# Patient Record
Sex: FEMALE | Race: WHITE | NOT HISPANIC OR LATINO | Employment: UNEMPLOYED | URBAN - METROPOLITAN AREA
[De-identification: names, ages, dates, MRNs, and addresses within clinical notes are randomized per-mention and may not be internally consistent; named-entity substitution may affect disease eponyms.]

---

## 2024-03-08 ENCOUNTER — PHARMACY VISIT (OUTPATIENT)
Dept: PHARMACY | Facility: MEDICAL CENTER | Age: 39
End: 2024-03-08
Payer: COMMERCIAL

## 2024-03-08 ENCOUNTER — HOSPITAL ENCOUNTER (EMERGENCY)
Facility: MEDICAL CENTER | Age: 39
End: 2024-03-08
Attending: EMERGENCY MEDICINE

## 2024-03-08 VITALS
RESPIRATION RATE: 18 BRPM | BODY MASS INDEX: 22.15 KG/M2 | HEART RATE: 107 BPM | OXYGEN SATURATION: 92 % | SYSTOLIC BLOOD PRESSURE: 168 MMHG | DIASTOLIC BLOOD PRESSURE: 95 MMHG | HEIGHT: 63 IN | TEMPERATURE: 98.1 F | WEIGHT: 125 LBS

## 2024-03-08 DIAGNOSIS — H66.012 ACUTE SUPPURATIVE OTITIS MEDIA OF LEFT EAR WITH SPONTANEOUS RUPTURE OF TYMPANIC MEMBRANE, RECURRENCE NOT SPECIFIED: ICD-10-CM

## 2024-03-08 DIAGNOSIS — I10 PRIMARY HYPERTENSION: ICD-10-CM

## 2024-03-08 LAB
ANION GAP SERPL CALC-SCNC: 13 MMOL/L (ref 7–16)
BASOPHILS # BLD AUTO: 0.5 % (ref 0–1.8)
BASOPHILS # BLD: 0.03 K/UL (ref 0–0.12)
BUN SERPL-MCNC: 14 MG/DL (ref 8–22)
CALCIUM SERPL-MCNC: 8.8 MG/DL (ref 8.5–10.5)
CHLORIDE SERPL-SCNC: 104 MMOL/L (ref 96–112)
CO2 SERPL-SCNC: 23 MMOL/L (ref 20–33)
CREAT SERPL-MCNC: 0.7 MG/DL (ref 0.5–1.4)
EKG IMPRESSION: NORMAL
EOSINOPHIL # BLD AUTO: 0.1 K/UL (ref 0–0.51)
EOSINOPHIL NFR BLD: 1.5 % (ref 0–6.9)
ERYTHROCYTE [DISTWIDTH] IN BLOOD BY AUTOMATED COUNT: 48.1 FL (ref 35.9–50)
GFR SERPLBLD CREATININE-BSD FMLA CKD-EPI: 113 ML/MIN/1.73 M 2
GLUCOSE SERPL-MCNC: 143 MG/DL (ref 65–99)
HCG SERPL QL: NEGATIVE
HCT VFR BLD AUTO: 43.3 % (ref 37–47)
HGB BLD-MCNC: 14.9 G/DL (ref 12–16)
IMM GRANULOCYTES # BLD AUTO: 0.01 K/UL (ref 0–0.11)
IMM GRANULOCYTES NFR BLD AUTO: 0.2 % (ref 0–0.9)
LYMPHOCYTES # BLD AUTO: 1.25 K/UL (ref 1–4.8)
LYMPHOCYTES NFR BLD: 19.1 % (ref 22–41)
MCH RBC QN AUTO: 27.7 PG (ref 27–33)
MCHC RBC AUTO-ENTMCNC: 34.4 G/DL (ref 32.2–35.5)
MCV RBC AUTO: 80.5 FL (ref 81.4–97.8)
MONOCYTES # BLD AUTO: 0.69 K/UL (ref 0–0.85)
MONOCYTES NFR BLD AUTO: 10.5 % (ref 0–13.4)
NEUTROPHILS # BLD AUTO: 4.48 K/UL (ref 1.82–7.42)
NEUTROPHILS NFR BLD: 68.2 % (ref 44–72)
NRBC # BLD AUTO: 0 K/UL
NRBC BLD-RTO: 0 /100 WBC (ref 0–0.2)
PLATELET # BLD AUTO: 301 K/UL (ref 164–446)
PMV BLD AUTO: 9.2 FL (ref 9–12.9)
POTASSIUM SERPL-SCNC: 3.9 MMOL/L (ref 3.6–5.5)
RBC # BLD AUTO: 5.38 M/UL (ref 4.2–5.4)
SODIUM SERPL-SCNC: 140 MMOL/L (ref 135–145)
WBC # BLD AUTO: 6.6 K/UL (ref 4.8–10.8)

## 2024-03-08 PROCEDURE — 85025 COMPLETE CBC W/AUTO DIFF WBC: CPT

## 2024-03-08 PROCEDURE — 93005 ELECTROCARDIOGRAM TRACING: CPT | Performed by: EMERGENCY MEDICINE

## 2024-03-08 PROCEDURE — 700102 HCHG RX REV CODE 250 W/ 637 OVERRIDE(OP): Performed by: EMERGENCY MEDICINE

## 2024-03-08 PROCEDURE — 700101 HCHG RX REV CODE 250: Performed by: EMERGENCY MEDICINE

## 2024-03-08 PROCEDURE — 700111 HCHG RX REV CODE 636 W/ 250 OVERRIDE (IP): Mod: JZ | Performed by: EMERGENCY MEDICINE

## 2024-03-08 PROCEDURE — 99284 EMERGENCY DEPT VISIT MOD MDM: CPT

## 2024-03-08 PROCEDURE — 36415 COLL VENOUS BLD VENIPUNCTURE: CPT

## 2024-03-08 PROCEDURE — RXMED WILLOW AMBULATORY MEDICATION CHARGE: Performed by: EMERGENCY MEDICINE

## 2024-03-08 PROCEDURE — 84703 CHORIONIC GONADOTROPIN ASSAY: CPT

## 2024-03-08 PROCEDURE — A9270 NON-COVERED ITEM OR SERVICE: HCPCS | Performed by: EMERGENCY MEDICINE

## 2024-03-08 PROCEDURE — 80048 BASIC METABOLIC PNL TOTAL CA: CPT

## 2024-03-08 PROCEDURE — 96374 THER/PROPH/DIAG INJ IV PUSH: CPT

## 2024-03-08 RX ORDER — NEOMYCIN SULFATE, POLYMYXIN B SULFATE AND HYDROCORTISONE 10; 3.5; 1 MG/ML; MG/ML; [USP'U]/ML
5 SUSPENSION/ DROPS AURICULAR (OTIC) ONCE
Status: COMPLETED | OUTPATIENT
Start: 2024-03-08 | End: 2024-03-08

## 2024-03-08 RX ORDER — AMOXICILLIN 500 MG/1
1000 CAPSULE ORAL ONCE
Status: COMPLETED | OUTPATIENT
Start: 2024-03-08 | End: 2024-03-08

## 2024-03-08 RX ORDER — NEOMYCIN SULFATE, POLYMYXIN B SULFATE AND HYDROCORTISONE 10; 3.5; 1 MG/ML; MG/ML; [USP'U]/ML
5 SUSPENSION/ DROPS AURICULAR (OTIC) 3 TIMES DAILY
Qty: 10 ML | Refills: 0 | Status: ACTIVE | OUTPATIENT
Start: 2024-03-08 | End: 2024-03-15

## 2024-03-08 RX ORDER — HYDRALAZINE HYDROCHLORIDE 20 MG/ML
10 INJECTION INTRAMUSCULAR; INTRAVENOUS EVERY 6 HOURS PRN
Status: DISCONTINUED | OUTPATIENT
Start: 2024-03-08 | End: 2024-03-08 | Stop reason: HOSPADM

## 2024-03-08 RX ORDER — AMOXICILLIN 500 MG/1
1000 CAPSULE ORAL 2 TIMES DAILY
Qty: 28 CAPSULE | Refills: 0 | Status: SHIPPED | OUTPATIENT
Start: 2024-03-08 | End: 2024-03-08

## 2024-03-08 RX ORDER — AMOXICILLIN 500 MG/1
1000 CAPSULE ORAL 2 TIMES DAILY
Qty: 28 CAPSULE | Refills: 0 | Status: ACTIVE | OUTPATIENT
Start: 2024-03-08 | End: 2024-03-15

## 2024-03-08 RX ORDER — LISINOPRIL 20 MG/1
20 TABLET ORAL DAILY
Qty: 30 TABLET | Refills: 0 | Status: SHIPPED | OUTPATIENT
Start: 2024-03-08

## 2024-03-08 RX ADMIN — NEOMYCIN SULFATE, POLYMYXIN B SULFATE AND HYDROCORTISONE 5 DROP: 10; 3.5; 1 SUSPENSION/ DROPS AURICULAR (OTIC) at 12:49

## 2024-03-08 RX ADMIN — AMOXICILLIN 1000 MG: 500 CAPSULE ORAL at 11:59

## 2024-03-08 RX ADMIN — HYDRALAZINE HYDROCHLORIDE 10 MG: 20 INJECTION INTRAMUSCULAR; INTRAVENOUS at 11:59

## 2024-03-08 ASSESSMENT — PAIN DESCRIPTION - PAIN TYPE: TYPE: ACUTE PAIN

## 2024-03-08 NOTE — ED PROVIDER NOTES
"ED PHYSICIAN NOTE    CHIEF COMPLAINT  Chief Complaint   Patient presents with    Ear Pain     L ear pain x 2 years \"after dad passed away and started getting better but then my , I think he was my  passed away and it started again.\"     Constipation     X 1 month. LBM yesterday.     Hypertension     Hx of without medications. No PCP at this time.          HPI/ROS  LIMITATION TO HISTORY   Patient is a poor historian      Linda Valdez is a 38 y.o. female who presents because of left ear pain.  She says that she has had left ear pain off and on for the last 2 years.  She describes some bizarre things that she associates with this pain.  She denies any trauma.  Has not had fever or displacement of the ear.  No swelling or headache.  She was noted to be hypertensive in triage.  Reports she thinks she has hypertension for her whole life but never really checks that she just thinks this because her dad was hypertensive.  She denies chest pain or shortness of breath.  She has no abdominal pain but she reports she has been having hard stools.  She had a bowel movement yesterday.  She denies pregnancy.  No dysuria hematuria frequency.    [place] [facility].  She is just visiting because her mother is in the hospital.    Patient denies any history of mental illness.  She denies SI HI hearing voices.    PAST MEDICAL HISTORY  History reviewed. No pertinent past medical history.    SOCIAL HISTORY  Social History     Tobacco Use    Smoking status: Every Day     Current packs/day: 1.00     Average packs/day: 1 pack/day for 22.2 years (22.2 ttl pk-yrs)     Types: Cigarettes     Start date: 2002    Smokeless tobacco: Never   Substance Use Topics    Alcohol use: Not Currently    Drug use: Yes     Comment: marajuna occ, meth last night       CURRENT MEDICATIONS  Home Medications       Reviewed by Angy Solomon R.N. (Registered Nurse) on 03/08/24 at 1013  Med List Status: Partial " "    Medication Last Dose Status        Patient Aaron Taking any Medications                           ALLERGIES  No Known Allergies    PHYSICAL EXAM  VITAL SIGNS: BP (!) 188/110   Pulse (!) 104   Temp 36.3 °C (97.4 °F) (Temporal)   Resp 14   Ht 1.6 m (5' 3\")   Wt 56.7 kg (125 lb)   LMP 01/08/2024   SpO2 99%   BMI 22.14 kg/m²    Constitutional: Awake and alert.  Disheveled  HENT: Swelling of the left external auditory canal with purulent discharge within it.  I cannot visualize the tympanic membrane on the left.  The right tympanic membrane and external canal normal.  No tenderness over the mastoids.  No displacement of the ear.  Eyes: Normal inspection  Neck: Grossly normal range of motion.  Cardiovascular: Normal heart rate, Normal rhythm.  Symmetric peripheral pulses.   Thorax & Lungs: No respiratory distress, No wheezing, No rales, No rhonchi, No chest tenderness.   Abdomen: Bowel sounds normal, soft, non-distended, nontender, no mass  Skin: No obvious rash.  Back: No tenderness, No CVA tenderness.   Extremities: No clubbing, cyanosis, edema, no Homans or cords.  Neurologic: Grossly normal   Psychiatric: Flat affect    DIAGNOSTIC STUDIES / PROCEDURES  LABS/EKG  Results for orders placed or performed during the hospital encounter of 03/08/24   CBC WITH DIFFERENTIAL   Result Value Ref Range    WBC 6.6 4.8 - 10.8 K/uL    RBC 5.38 4.20 - 5.40 M/uL    Hemoglobin 14.9 12.0 - 16.0 g/dL    Hematocrit 43.3 37.0 - 47.0 %    MCV 80.5 (L) 81.4 - 97.8 fL    MCH 27.7 27.0 - 33.0 pg    MCHC 34.4 32.2 - 35.5 g/dL    RDW 48.1 35.9 - 50.0 fL    Platelet Count 301 164 - 446 K/uL    MPV 9.2 9.0 - 12.9 fL    Neutrophils-Polys 68.20 44.00 - 72.00 %    Lymphocytes 19.10 (L) 22.00 - 41.00 %    Monocytes 10.50 0.00 - 13.40 %    Eosinophils 1.50 0.00 - 6.90 %    Basophils 0.50 0.00 - 1.80 %    Immature Granulocytes 0.20 0.00 - 0.90 %    Nucleated RBC 0.00 0.00 - 0.20 /100 WBC    Neutrophils (Absolute) 4.48 1.82 - 7.42 K/uL    " Lymphs (Absolute) 1.25 1.00 - 4.80 K/uL    Monos (Absolute) 0.69 0.00 - 0.85 K/uL    Eos (Absolute) 0.10 0.00 - 0.51 K/uL    Baso (Absolute) 0.03 0.00 - 0.12 K/uL    Immature Granulocytes (abs) 0.01 0.00 - 0.11 K/uL    NRBC (Absolute) 0.00 K/uL   BASIC METABOLIC PANEL   Result Value Ref Range    Sodium 140 135 - 145 mmol/L    Potassium 3.9 3.6 - 5.5 mmol/L    Chloride 104 96 - 112 mmol/L    Co2 23 20 - 33 mmol/L    Glucose 143 (H) 65 - 99 mg/dL    Bun 14 8 - 22 mg/dL    Creatinine 0.70 0.50 - 1.40 mg/dL    Calcium 8.8 8.5 - 10.5 mg/dL    Anion Gap 13.0 7.0 - 16.0   HCG QUAL SERUM   Result Value Ref Range    Beta-Hcg Qualitative Serum Negative Negative   ESTIMATED GFR   Result Value Ref Range    GFR (CKD-EPI) 113 >60 mL/min/1.73 m 2   EKG   Result Value Ref Range    Report       [facility] Emergency Dept.    Test Date:  2024  Pt Name:    MONIKA MONTANA                  Department: ER  MRN:        [id number]                      Room:       [facility]  Gender:     Female                       Technician: [id number]  :        1985                   Requested By:ROBERT VELAZQUEZ  Order #:    [id number]                    Reading MD: ROBERT VELAZQUEZ MD    Measurements  Intervals                                Axis  Rate:       105                          P:          68  IL:         118                          QRS:        17  QRSD:       88                           T:          232  QT:         327  QTc:        433    Interpretive Statements  Sinus tachycardia  Consider right atrial enlargement  LVH with secondary repolarization abnormality  Anterior ST elevation, probably due to LVH  No previous ECG available for comparison  Electronically Signed On 2024 13:34:35 PST by ROBERT VELAZQUEZ MD        I have independently interpreted this EKG as documented above  Rhythm strip interpretation-sinus rhythm      COURSE & MEDICAL DECISION MAKING    INITIAL ASSESSMENT, COURSE AND PLAN  Care Narrative:  Patient presents with left ear pain.  She appears to have some mental illness although denies this.  She appears capable of caring for self she is not suicidal homicidal or hallucinating.  No indication for legal hold or further psychiatric care at this time.  She has identified to have at least otitis externa.  I cannot see the tympanic membrane otitis media and/or otitis media with perforation is a possibility.  This will be treated with Polytrim otic.  She was given first dose in the emergency department.  Will also be given systemic antibiotics amoxicillin.  She does not have any evidence of malignant otitis media, mastoiditis, chondritis, meningitis or complication of otitis.    Patient was noted to be significantly hypertensive on arrival.  She does not check her blood pressure.  I obtained screening labs there is no renal dysfunction.  No leukocytosis left shift she is not pregnant.  EKG demonstrates LVH and hypertension is likely chronic.  Patient was given hydralazine in the emergency department.  Blood pressure improved.  I will start the patient on lisinopril she is given a prescription for this in addition to amoxicillin.    Discussed abnormal findings and plan for follow-up.  She needs to keep a blood pressure log and follow-up with primary doctor.  She does not live locally stressed the importance of primary care.  She should return to the ER for worsening, not improving, fevers headache concern      DISPOSITION AND DISCUSSIONS    Escalation of care considered, and ultimately not performed:acute inpatient care management, however at this time, the patient is most appropriate for outpatient management    Prescription drugs considered and/or prescribed: Amoxicillin, lisinopril, Polytrim    FINAL IMPRESSION  1.  Otitis media  2.  Otitis externa  3.  Hypertension    This dictation was created using voice recognition software. The accuracy of the dictation is limited to the abilities of the software. I  expect there may be some errors of grammar and possibly content. The nursing notes were reviewed and certain aspects of this information were incorporated into this note.    Electronically signed by: Les Purcell M.D., 3/8/2024

## 2024-03-08 NOTE — ED NOTES
Pt back to room stating she ate some raisins and is feeling better. Pt asking for an ERP to look into her ear because she still has ear pain

## 2024-03-08 NOTE — ED TRIAGE NOTES
"Chief Complaint   Patient presents with    Ear Pain     L ear pain x 2 years \"after dad passed away and started getting better but then my , I think he was my  passed away and it started again.\"     Constipation     X 1 month. LBM yesterday.     Hypertension     Hx of without medications. No PCP at this time.          Pt ambulated to triage for above complaint.  Pt is AO x 4, follows commands, and responds appropriately to questions. Patient's breathing is unlabored and pain is currently 7/10 on the 0-10 pain scale.  Pt placed in lobby. Patient educated on triage process and encouraged to alert staff for any changes.      BP (!) 192/126   Pulse 87   Temp 36.3 °C (97.4 °F) (Temporal)   Resp 14   Ht 1.6 m (5' 3\")   Wt 56.7 kg (125 lb)   SpO2 99%     "

## 2024-03-08 NOTE — ED NOTES
Bedside report received from EUGENIO Temple. Assumed care of patient and she is resting, denies any pain or needs. Bed locked and in lowest position. Call light available and within reach. No oxygen requirements at this time. Appropriate fall precautions in place.  Patient on  automatic BP and pulse oximeter. See MAR for medications and infusions. No distress noted.

## 2024-03-08 NOTE — ED NOTES
PIV removed. Patient provided discharge instructions and medication information. Patient verbalizes understanding and denies any further questions. Patient instructed to return if condition worsens. No distress noted.